# Patient Record
(demographics unavailable — no encounter records)

---

## 2024-01-21 DIAGNOSIS — N30.90 CYSTITIS: Primary | ICD-10-CM

## 2024-01-22 RX ORDER — NITROFURANTOIN 25; 75 MG/1; MG/1
CAPSULE ORAL
Qty: 14 CAPSULE | Refills: 0 | Status: SHIPPED | OUTPATIENT
Start: 2024-01-22

## 2024-01-22 NOTE — TELEPHONE ENCOUNTER
Est pt last seen 05/23/2022 Annual / spoke to pt advised that Dr Garrett has received refill request for Macrobid / Pt states that refill request was intended for  Dr Garrett  / offered to make Annual appt / pt declined / pt states that she is seeing a different provider.